# Patient Record
Sex: FEMALE | Race: BLACK OR AFRICAN AMERICAN | NOT HISPANIC OR LATINO | Employment: UNEMPLOYED | RURAL
[De-identification: names, ages, dates, MRNs, and addresses within clinical notes are randomized per-mention and may not be internally consistent; named-entity substitution may affect disease eponyms.]

---

## 2022-07-22 ENCOUNTER — OFFICE VISIT (OUTPATIENT)
Dept: OTOLARYNGOLOGY | Facility: CLINIC | Age: 47
End: 2022-07-22

## 2022-07-22 VITALS — WEIGHT: 225.4 LBS | BODY MASS INDEX: 34.16 KG/M2 | OXYGEN SATURATION: 97 % | HEIGHT: 68 IN

## 2022-07-22 DIAGNOSIS — J30.1 ALLERGIC RHINITIS DUE TO POLLEN, UNSPECIFIED SEASONALITY: Primary | ICD-10-CM

## 2022-07-22 DIAGNOSIS — J34.2 NASAL SEPTAL DEFORMITY: ICD-10-CM

## 2022-07-22 PROCEDURE — 99204 OFFICE O/P NEW MOD 45 MIN: CPT | Performed by: OTOLARYNGOLOGY

## 2022-07-22 PROCEDURE — 31231 NASAL ENDOSCOPY DX: CPT | Performed by: OTOLARYNGOLOGY

## 2022-07-22 RX ORDER — AZELASTINE 1 MG/ML
2 SPRAY, METERED NASAL 2 TIMES DAILY
Qty: 30 ML | Refills: 11 | Status: SHIPPED | OUTPATIENT
Start: 2022-07-22

## 2022-07-22 RX ORDER — FLUTICASONE PROPIONATE 50 MCG
2 SPRAY, SUSPENSION (ML) NASAL DAILY
COMMUNITY

## 2022-07-22 RX ORDER — CETIRIZINE HYDROCHLORIDE 10 MG/1
10 TABLET ORAL DAILY
COMMUNITY

## 2022-07-22 RX ORDER — MONTELUKAST SODIUM 10 MG/1
10 TABLET ORAL DAILY
Qty: 90 TABLET | Refills: 3 | Status: SHIPPED | OUTPATIENT
Start: 2022-07-22

## 2022-07-22 NOTE — PROGRESS NOTES
Julia Young is a 46 y.o. female.       History of Present Illness   Patient states she has a longstanding history of allergic rhinitis due to pollen.  Has been taking allergy shots since 2018.  Initially had a rather dramatic response to treatment but reports that since then she has had worsening of pressure and congestion in her nose and midface.  Has been treated with multiple rounds of antibiotics and steroids.  Currently uses Flonase daily and Zyrtec twice a day.  Apparently she was on Singulair in the past but is not taking it now.  Underwent a CT scan of the sinuses.  This is available and personally reviewed and shows no evidence of disease of the paranasal sinuses.  Apparently she was told by the referring provider that she had turbinate hypertrophy.  She states she never has any rhinorrhea.      The following portions of the patient's history were reviewed and updated as appropriate: allergies, current medications, past family history, past medical history, past social history, past surgical history and problem list.     reports that she has never smoked. She has never used smokeless tobacco.   Patient is not a tobacco user and has not been counseled for use of tobacco products      Review of Systems        Objective   Physical Exam  Ears: External ears no deformity, canals no discharge, tympanic membranes intact clear and mobile bilaterally.  Nares: Pale boggy mucosa.  Septum is to the left inferiorly.  No polyp or purulence seen.  Oral cavity: Lips and gums without lesions.  Tongue and floor of mouth without lesions.  Parotid and submandibular ducts unobstructed.  No mucosal lesions on the buccal mucosa or vestibule of the mouth.  Pharynx: No erythema exudate or mass  Neck: No lymphadenopathy.  No thyromegaly.  Trachea and larynx midline.  No masses in the parotid or submandibular glands.  Nasal endoscopy is performed: Talha-Synephrine and Xylocaine are instilled the nares bilaterally.  0°  scope is passed into each nostril.  The inferior, middle, and superior turbinates as well as nasal septum and nasopharynx are examined.  Pertinent findings include: Septum to the left inferiorly impacting on the inferior turbinate and obstructing essentially 100% of the airway at the level of the inferior turbinate.  Middle meatal cleft show no mass polyp or purulence.  No mass in the superior meatus or olfactory mucosa.  No mass in the nasopharynx.         Assessment and Plan   Diagnoses and all orders for this visit:    1. Allergic rhinitis due to pollen, unspecified seasonality (Primary)    2. Nasal septal deformity    Other orders  -     montelukast (SINGULAIR) 10 MG tablet; Take 1 tablet by mouth Daily.  Dispense: 90 tablet; Refill: 3  -     azelastine (ASTELIN) 0.1 % nasal spray; 2 sprays into the nostril(s) as directed by provider 2 (Two) Times a Day. Use in each nostril as directed  Dispense: 30 mL; Refill: 11             Plan: Explained to the patient that I do not believe she is having episodes of bacterial sinusitis and does not need additional antibiotics.  We will try a different medical regimen for her allergic rhinitis before considering any type of surgery.  Told her that while she does have some hypertrophy of the turbinates, surgery would not change the fact that she still has significant mucosal disease.  We will restart Singulair 10 mg every day and add Astelin 2 sprays each nostril twice a day.  Continue Flonase daily.  May still use Zyrtec as needed for breakthrough symptoms.  She just had a steroid shot 2 weeks ago so we will not give additional steroids at this time.  Recheck in 2 months, call sooner for problems.

## 2022-09-23 ENCOUNTER — OFFICE VISIT (OUTPATIENT)
Dept: OTOLARYNGOLOGY | Facility: CLINIC | Age: 47
End: 2022-09-23

## 2022-09-23 VITALS — BODY MASS INDEX: 34.4 KG/M2 | TEMPERATURE: 98 F | HEIGHT: 68 IN | WEIGHT: 227 LBS

## 2022-09-23 DIAGNOSIS — J31.0 CHRONIC RHINITIS: Primary | ICD-10-CM

## 2022-09-23 DIAGNOSIS — J34.2 NASAL SEPTAL DEFORMITY: ICD-10-CM

## 2022-09-23 PROCEDURE — 99213 OFFICE O/P EST LOW 20 MIN: CPT | Performed by: OTOLARYNGOLOGY

## 2022-09-26 NOTE — PROGRESS NOTES
"Julia Young is a 46 y.o. female.       History of Present Illness     Patient was seen previously with allergic rhinitis as well as a nasal septal deformity.  Reports that she has been using her Astelin and Singulair and her allergy symptoms are improved.  Says she feels like something is \"crawling\" in her left ear at times.  No purulent rhinorrhea.    The following portions of the patient's history were reviewed and updated as appropriate: allergies, current medications, past family history, past medical history, past social history, past surgical history and problem list.     reports that she has never smoked. She has never used smokeless tobacco.   Patient is not a tobacco user and has not been counseled for use of tobacco products      Review of Systems        Objective   Physical Exam  Ears: External ears no deformity, canals no discharge, tympanic membranes intact clear and mobile bilaterally.  Nares pale boggy mucosa septum to the left inferiorly no mass or polyps  Oral cavity: No masses or lesions  Pharynx: No erythema exudate or mass  Neck no adenopathy or mass         Assessment and Plan   Diagnoses and all orders for this visit:    1. Chronic rhinitis (Primary)    2. Nasal septal deformity             Plan: Explained to the patient that her ears were clear and I suspect the sensation she is having is referral from her throat from postnasal drainage.  I encouraged her to continue using her Astelin and Singulair and as long as her symptoms remain well controlled return in 6 months but call sooner for problems.    "

## 2022-11-01 RX ORDER — PREDNISONE 20 MG/1
TABLET ORAL
Qty: 12 TABLET | Refills: 0 | Status: SHIPPED | OUTPATIENT
Start: 2022-11-01 | End: 2023-02-08 | Stop reason: SDUPTHER

## 2023-02-08 RX ORDER — PREDNISONE 20 MG/1
TABLET ORAL
Qty: 12 TABLET | Refills: 0 | Status: SHIPPED | OUTPATIENT
Start: 2023-02-08

## 2023-09-05 ENCOUNTER — OFFICE VISIT (OUTPATIENT)
Dept: OTOLARYNGOLOGY | Facility: CLINIC | Age: 48
End: 2023-09-05
Payer: OTHER GOVERNMENT

## 2023-09-05 VITALS — HEIGHT: 68 IN | BODY MASS INDEX: 35.16 KG/M2 | WEIGHT: 232 LBS | OXYGEN SATURATION: 97 %

## 2023-09-05 DIAGNOSIS — J31.0 CHRONIC RHINITIS: Primary | ICD-10-CM

## 2023-09-05 DIAGNOSIS — J34.2 NASAL SEPTAL DEFORMITY: ICD-10-CM

## 2023-09-05 PROCEDURE — 99214 OFFICE O/P EST MOD 30 MIN: CPT | Performed by: OTOLARYNGOLOGY

## 2023-09-05 RX ORDER — CETIRIZINE HYDROCHLORIDE 10 MG/1
10 TABLET ORAL DAILY
Qty: 90 TABLET | Refills: 3 | Status: SHIPPED | OUTPATIENT
Start: 2023-09-05

## 2023-09-05 RX ORDER — AZELASTINE 1 MG/ML
2 SPRAY, METERED NASAL 2 TIMES DAILY
Qty: 30 ML | Refills: 11 | Status: SHIPPED | OUTPATIENT
Start: 2023-09-05

## 2023-09-05 RX ORDER — FLUTICASONE PROPIONATE 50 MCG
2 SPRAY, SUSPENSION (ML) NASAL DAILY
Qty: 16 G | Refills: 11 | Status: SHIPPED | OUTPATIENT
Start: 2023-09-05

## 2023-09-05 RX ORDER — MONTELUKAST SODIUM 10 MG/1
10 TABLET ORAL DAILY
Qty: 90 TABLET | Refills: 3 | Status: SHIPPED | OUTPATIENT
Start: 2023-09-05

## 2023-09-05 NOTE — PROGRESS NOTES
"Julia Young is a 47 y.o. female.       History of Present Illness   Patient is followed with chronic rhinitis with an allergic component.  Has used Astelin Singulair Flonase and Zyrtec.  Says she was \"kicked off\" her allergy shots back in February but has done reasonably well since then.  No purulent rhinorrhea.  Is having a little bit of a flareup right now with ragweed season.      The following portions of the patient's history were reviewed and updated as appropriate: allergies, current medications, past family history, past medical history, past social history, past surgical history and problem list.     reports that she has never smoked. She has never used smokeless tobacco.   Patient is not a tobacco user and has not been counseled for use of tobacco products      Review of Systems        Objective   Physical Exam  Nares: Pale boggy mucosa.  Septum to the left.  Modest nonpurulent discharge  Oral cavity no masses or lesions  Pharynx no erythema or exudate  Neck no adenopathy or mass         Assessment and Plan   Diagnoses and all orders for this visit:    1. Chronic rhinitis (Primary)    2. Nasal septal deformity    Other orders  -     azelastine (ASTELIN) 0.1 % nasal spray; 2 sprays into the nostril(s) as directed by provider 2 (Two) Times a Day. Use in each nostril as directed  Dispense: 30 mL; Refill: 11  -     montelukast (SINGULAIR) 10 MG tablet; Take 1 tablet by mouth Daily.  Dispense: 90 tablet; Refill: 3  -     cetirizine (zyrTEC) 10 MG tablet; Take 1 tablet by mouth Daily.  Dispense: 90 tablet; Refill: 3  -     fluticasone (FLONASE) 50 MCG/ACT nasal spray; 2 sprays into the nostril(s) as directed by provider Daily.  Dispense: 16 g; Refill: 11             Plan: She seems to be reasonably well controlled with medical therapy.  We will refill her medicines x1 year and plan on seeing her again in 1 year.  Told her if her symptoms worsened and were persistent immunotherapy would be " reconsidered at that point.